# Patient Record
Sex: MALE | Race: WHITE
[De-identification: names, ages, dates, MRNs, and addresses within clinical notes are randomized per-mention and may not be internally consistent; named-entity substitution may affect disease eponyms.]

---

## 2019-12-23 ENCOUNTER — HOSPITAL ENCOUNTER (EMERGENCY)
Dept: HOSPITAL 92 - ERS | Age: 19
Discharge: HOME | End: 2019-12-23
Payer: COMMERCIAL

## 2019-12-23 DIAGNOSIS — Z79.899: ICD-10-CM
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
## 2019-12-23 DIAGNOSIS — S46.911A: Primary | ICD-10-CM

## 2019-12-23 DIAGNOSIS — F98.8: ICD-10-CM

## 2019-12-23 NOTE — RAD
THREE VIEWS RIGHT SHOULDER:

 

Comparison: None. 

 

History: Right shoulder pain. 

 

FINDINGS: 

Three views of the right shoulder shows no evidence of acute fracture or dislocation. No degenerative
 changes are seen. The visualized right thorax is unremarkable. 

 

IMPRESSION: 

Unremarkable exam. 

 

POS: C